# Patient Record
Sex: FEMALE | URBAN - METROPOLITAN AREA
[De-identification: names, ages, dates, MRNs, and addresses within clinical notes are randomized per-mention and may not be internally consistent; named-entity substitution may affect disease eponyms.]

---

## 2019-01-08 ENCOUNTER — APPOINTMENT (RX ONLY)
Dept: URBAN - METROPOLITAN AREA CLINIC 91 | Facility: CLINIC | Age: 64
Setting detail: DERMATOLOGY
End: 2019-01-08

## 2019-01-08 DIAGNOSIS — D17 BENIGN LIPOMATOUS NEOPLASM: ICD-10-CM

## 2019-01-08 DIAGNOSIS — Z72.0 TOBACCO USE: ICD-10-CM

## 2019-01-08 PROBLEM — E78.5 HYPERLIPIDEMIA, UNSPECIFIED: Status: ACTIVE | Noted: 2019-01-08

## 2019-01-08 PROBLEM — E05.90 THYROTOXICOSIS, UNSPECIFIED WITHOUT THYROTOXIC CRISIS OR STORM: Status: ACTIVE | Noted: 2019-01-08

## 2019-01-08 PROBLEM — D17.23 BENIGN LIPOMATOUS NEOPLASM OF SKIN AND SUBCUTANEOUS TISSUE OF RIGHT LEG: Status: ACTIVE | Noted: 2019-01-08

## 2019-01-08 PROBLEM — L20.84 INTRINSIC (ALLERGIC) ECZEMA: Status: ACTIVE | Noted: 2019-01-08

## 2019-01-08 PROBLEM — M12.9 ARTHROPATHY, UNSPECIFIED: Status: ACTIVE | Noted: 2019-01-08

## 2019-01-08 PROBLEM — D17.22 BENIGN LIPOMATOUS NEOPLASM OF SKIN AND SUBCUTANEOUS TISSUE OF LEFT ARM: Status: ACTIVE | Noted: 2019-01-08

## 2019-01-08 PROCEDURE — ? ADDITIONAL NOTES

## 2019-01-08 PROCEDURE — ? COUNSELING

## 2019-01-08 PROCEDURE — 99213 OFFICE O/P EST LOW 20 MIN: CPT

## 2019-01-08 ASSESSMENT — LOCATION DETAILED DESCRIPTION DERM
LOCATION DETAILED: LEFT ANTECUBITAL SKIN
LOCATION DETAILED: RIGHT ANTERIOR DISTAL THIGH
LOCATION DETAILED: LEFT VENTRAL PROXIMAL FOREARM
LOCATION DETAILED: RIGHT ANTERIOR PROXIMAL THIGH

## 2019-01-08 ASSESSMENT — LOCATION SIMPLE DESCRIPTION DERM
LOCATION SIMPLE: LEFT FOREARM
LOCATION SIMPLE: LEFT UPPER ARM
LOCATION SIMPLE: RIGHT THIGH

## 2019-01-08 ASSESSMENT — LOCATION ZONE DERM
LOCATION ZONE: ARM
LOCATION ZONE: LEG

## 2019-01-08 NOTE — HPI: SKIN LESION (LIPOMA)
How Severe Is Your Lipoma?: moderate
Is This A New Presentation, Or A Follow-Up?: Skin Lesion
Additional History: History of lipomas, previously had 2 removed from back.

## 2019-01-08 NOTE — PROCEDURE: ADDITIONAL NOTES
Additional Notes: Tobacco/nicotine dependence is a condition that often requires repeated treatments, but there are helpful treatments and resources for quitting. Smokers can and do quit smoking. In fact, today there are more former smokers than current smokers.  People who stop smoking often start again because of withdrawal symptoms, stress, and weight gain.\\nNicotine withdrawal symptoms may include:4,6\\nFeeling irritable, angry, or anxious\\nHaving trouble thinking\\nCraving tobacco products\\nFeeling hungrier than usual\\n\\nYou are never too old to quit.\\n\\n                              Ways to Quit Smoking\\nMost former smokers quit without using one of the treatments that scientific research has shown can work.  However, the following treatments are proven to be effective for smokers who want help to quit:   Brief help by a doctor (such as when a doctor takes 10 minutes or less to give a patient advice and assistance about quitting)\\nIndividual, group, or telephone counseling\\nBehavioral therapies (such as training in problem solving)\\nTreatments with more person-to-person contact and more intensity (such as more or longer counseling sessions)\\nPrograms to deliver treatments using mobile phones\\nMedications for quitting that have been found to be effective include the following:\\nNicotine replacement products\\nOver-the-counter (nicotine patch [which is also available by prescription], gum, lozenge)\\nPrescription (nicotine patch, inhaler, nasal spray)\\nPrescription non-nicotine medications: bupropion SR (Zyban®), varenicline tartrate (Chantix®)\\nCounseling and medication are both effective for treating tobacco dependence, and using them together is more effective than using either one alone.\\nMore information is needed about quitting for people who smoke cigarettes and also use other types of tobacco.\\n\\nHelpful Resources\\nQuitline Services\\nCall 1-800-QUIT-NOW (8-725-781-7048) if you want help quitting. This is a free telephone support service that can help people who want to stop smoking or using tobacco. Callers are routed to their state quitlines, which offer several types of quit information and services. These may include:\\n\\nFree support, advice, and counseling from experienced quitline coaches\\nA personalized quit plan\\nPractical information on how to quit, including ways to cope with nicotine withdrawal\\nThe latest information about stop-smoking medications\\nFree or discounted medications (available for at least some callers in most states)\\nReferrals to other resources\\nMailed self-help materials\\nOnline Help\\nGet free help online, too.\\n\\nFor information on quitting, go to the Quit Smoking Resources page on CDC’s Smoking & Tobacco Use Web site.\\nRead inspiring stories about former smokers and their reasons for quitting at CDC’s Tips From Former Smokers Web site.\\nThe I’m Ready to Quit! page links to many helpful resources.\\nPublications\\nVisit [employer]’s Online Publications Catalog to order free copies of materials about quitting as well as helpful resources about tobacco use prevention.\\n\\n\\n\\n Additional Notes: Tobacco/nicotine dependence is a condition that often requires repeated treatments, but there are helpful treatments and resources for quitting. Smokers can and do quit smoking. In fact, today there are more former smokers than current smokers.  People who stop smoking often start again because of withdrawal symptoms, stress, and weight gain.\\nNicotine withdrawal symptoms may include:4,6\\nFeeling irritable, angry, or anxious\\nHaving trouble thinking\\nCraving tobacco products\\nFeeling hungrier than usual\\n\\nYou are never too old to quit.\\n\\n                              Ways to Quit Smoking\\nMost former smokers quit without using one of the treatments that scientific research has shown can work.  However, the following treatments are proven to be effective for smokers who want help to quit:   Brief help by a doctor (such as when a doctor takes 10 minutes or less to give a patient advice and assistance about quitting)\\nIndividual, group, or telephone counseling\\nBehavioral therapies (such as training in problem solving)\\nTreatments with more person-to-person contact and more intensity (such as more or longer counseling sessions)\\nPrograms to deliver treatments using mobile phones\\nMedications for quitting that have been found to be effective include the following:\\nNicotine replacement products\\nOver-the-counter (nicotine patch [which is also available by prescription], gum, lozenge)\\nPrescription (nicotine patch, inhaler, nasal spray)\\nPrescription non-nicotine medications: bupropion SR (Zyban®), varenicline tartrate (Chantix®)\\nCounseling and medication are both effective for treating tobacco dependence, and using them together is more effective than using either one alone.\\nMore information is needed about quitting for people who smoke cigarettes and also use other types of tobacco.\\n\\nHelpful Resources\\nQuitline Services\\nCall 1-800-QUIT-NOW (3-810-879-8276) if you want help quitting. This is a free telephone support service that can help people who want to stop smoking or using tobacco. Callers are routed to their state quitlines, which offer several types of quit information and services. These may include:\\n\\nFree support, advice, and counseling from experienced quitline coaches\\nA personalized quit plan\\nPractical information on how to quit, including ways to cope with nicotine withdrawal\\nThe latest information about stop-smoking medications\\nFree or discounted medications (available for at least some callers in most states)\\nReferrals to other resources\\nMailed self-help materials\\nOnline Help\\nGet free help online, too.\\n\\nFor information on quitting, go to the Quit Smoking Resources page on CDC’s Smoking & Tobacco Use Web site.\\nRead inspiring stories about former smokers and their reasons for quitting at CDC’s Tips From Former Smokers Web site.\\nThe I’m Ready to Quit! page links to many helpful resources.\\nPublications\\nVisit [employer]’s Online Publications Catalog to order free copies of materials about quitting as well as helpful resources about tobacco use prevention.\\n\\n\\n\\n